# Patient Record
Sex: FEMALE | Race: WHITE | NOT HISPANIC OR LATINO
[De-identification: names, ages, dates, MRNs, and addresses within clinical notes are randomized per-mention and may not be internally consistent; named-entity substitution may affect disease eponyms.]

---

## 2020-08-24 PROBLEM — Z00.00 ENCOUNTER FOR PREVENTIVE HEALTH EXAMINATION: Status: ACTIVE | Noted: 2020-08-24

## 2020-08-28 ENCOUNTER — APPOINTMENT (OUTPATIENT)
Dept: OBGYN | Facility: CLINIC | Age: 70
End: 2020-08-28

## 2022-09-29 ENCOUNTER — OUTPATIENT (OUTPATIENT)
Dept: OUTPATIENT SERVICES | Facility: HOSPITAL | Age: 72
LOS: 1 days | Discharge: HOME | End: 2022-09-29

## 2022-09-29 DIAGNOSIS — R13.10 DYSPHAGIA, UNSPECIFIED: ICD-10-CM

## 2022-12-30 ENCOUNTER — OFFICE (OUTPATIENT)
Dept: URBAN - METROPOLITAN AREA CLINIC 77 | Facility: CLINIC | Age: 72
Setting detail: OPHTHALMOLOGY
End: 2022-12-30
Payer: MEDICARE

## 2022-12-30 DIAGNOSIS — H53.022: ICD-10-CM

## 2022-12-30 DIAGNOSIS — H40.033: ICD-10-CM

## 2022-12-30 DIAGNOSIS — H25.13: ICD-10-CM

## 2022-12-30 DIAGNOSIS — H35.371: ICD-10-CM

## 2022-12-30 PROCEDURE — 92250 FUNDUS PHOTOGRAPHY W/I&R: CPT | Performed by: OPHTHALMOLOGY

## 2022-12-30 PROCEDURE — 92004 COMPRE OPH EXAM NEW PT 1/>: CPT | Performed by: OPHTHALMOLOGY

## 2022-12-30 PROCEDURE — 92020 GONIOSCOPY: CPT | Performed by: OPHTHALMOLOGY

## 2022-12-30 ASSESSMENT — REFRACTION_CURRENTRX
OD_CYLINDER: -0.25
OD_SPHERE: +2.75
OS_OVR_VA: 20/
OS_CYLINDER: -0.25
OD_OVR_VA: 20/
OS_AXIS: 080
OD_AXIS: 78
OS_SPHERE: +5.00

## 2022-12-30 ASSESSMENT — REFRACTION_AUTOREFRACTION
OS_AXIS: 049
OS_SPHERE: +5.75
OD_SPHERE: +3.50
OS_CYLINDER: -1.00
OD_AXIS: 028
OD_CYLINDER: -1.00

## 2022-12-30 ASSESSMENT — SPHEQUIV_DERIVED
OS_SPHEQUIV: 5.25
OS_SPHEQUIV: 5.25
OD_SPHEQUIV: 3
OD_SPHEQUIV: 2.75

## 2022-12-30 ASSESSMENT — REFRACTION_MANIFEST
OS_ADD: +3.00
OD_SPHERE: +3.00
OD_VA1: 20/40
OS_AXIS: 45
OS_CYLINDER: -0.50
OD_ADD: +3.00
OS_SPHERE: +5.50
OS_VA1: 20/30
OD_AXIS: 25
OD_CYLINDER: -0.50

## 2022-12-30 ASSESSMENT — KERATOMETRY
OS_K2POWER_DIOPTERS: 45.00
OS_AXISANGLE_DEGREES: 122
OS_K1POWER_DIOPTERS: 44.50
OD_K1POWER_DIOPTERS: 44.00
OD_AXISANGLE_DEGREES: 138
OD_K2POWER_DIOPTERS: 44.25

## 2022-12-30 ASSESSMENT — CONFRONTATIONAL VISUAL FIELD TEST (CVF)
OS_FINDINGS: FULL
OD_FINDINGS: FULL

## 2022-12-30 ASSESSMENT — VISUAL ACUITY
OD_BCVA: 20/50
OS_BCVA: 20/40

## 2022-12-30 ASSESSMENT — AXIALLENGTH_DERIVED
OS_AL: 21.3277
OD_AL: 22.3589
OD_AL: 22.2718
OS_AL: 21.3277

## 2023-01-16 ENCOUNTER — OFFICE (OUTPATIENT)
Dept: URBAN - METROPOLITAN AREA CLINIC 77 | Facility: CLINIC | Age: 73
Setting detail: OPHTHALMOLOGY
End: 2023-01-16
Payer: MEDICARE

## 2023-01-16 DIAGNOSIS — H40.032: ICD-10-CM

## 2023-01-16 PROBLEM — H40.031 ANATOMICAL NARROW ANGLE; RIGHT EYE, LEFT EYE: Status: ACTIVE | Noted: 2023-01-16

## 2023-01-16 PROCEDURE — 66761 REVISION OF IRIS: CPT | Performed by: OPHTHALMOLOGY

## 2023-01-16 ASSESSMENT — TONOMETRY
OS_IOP_MMHG: 19
OD_IOP_MMHG: 17

## 2023-01-16 ASSESSMENT — CONFRONTATIONAL VISUAL FIELD TEST (CVF)
OS_FINDINGS: FULL
OD_FINDINGS: FULL

## 2023-01-17 ASSESSMENT — AXIALLENGTH_DERIVED
OS_AL: 21.3277
OS_AL: 21.3277
OD_AL: 22.3589
OD_AL: 22.2718

## 2023-01-17 ASSESSMENT — REFRACTION_CURRENTRX
OD_OVR_VA: 20/
OS_OVR_VA: 20/
OS_CYLINDER: -0.25
OD_SPHERE: +2.75
OS_SPHERE: +5.00
OD_CYLINDER: -0.25
OS_AXIS: 080
OD_AXIS: 78

## 2023-01-17 ASSESSMENT — REFRACTION_AUTOREFRACTION
OS_AXIS: 049
OD_SPHERE: +3.50
OD_AXIS: 028
OS_CYLINDER: -1.00
OD_CYLINDER: -1.00
OS_SPHERE: +5.75

## 2023-01-17 ASSESSMENT — SPHEQUIV_DERIVED
OS_SPHEQUIV: 5.25
OD_SPHEQUIV: 3
OS_SPHEQUIV: 5.25
OD_SPHEQUIV: 2.75

## 2023-01-17 ASSESSMENT — KERATOMETRY
OS_K1POWER_DIOPTERS: 44.50
OD_K2POWER_DIOPTERS: 44.25
OD_AXISANGLE_DEGREES: 138
OS_AXISANGLE_DEGREES: 122
OS_K2POWER_DIOPTERS: 45.00
OD_K1POWER_DIOPTERS: 44.00

## 2023-01-17 ASSESSMENT — REFRACTION_MANIFEST
OS_ADD: +3.00
OS_CYLINDER: -0.50
OD_ADD: +3.00
OS_AXIS: 45
OD_AXIS: 25
OS_VA1: 20/30
OS_SPHERE: +5.50
OD_SPHERE: +3.00
OD_VA1: 20/40
OD_CYLINDER: -0.50

## 2023-01-17 ASSESSMENT — VISUAL ACUITY
OD_BCVA: 20/50
OS_BCVA: 20/40

## 2023-04-24 ENCOUNTER — OFFICE (OUTPATIENT)
Dept: URBAN - METROPOLITAN AREA CLINIC 77 | Facility: CLINIC | Age: 73
Setting detail: OPHTHALMOLOGY
End: 2023-04-24
Payer: MEDICARE

## 2023-04-24 DIAGNOSIS — H53.022: ICD-10-CM

## 2023-04-24 DIAGNOSIS — H25.13: ICD-10-CM

## 2023-04-24 DIAGNOSIS — H40.032: ICD-10-CM

## 2023-04-24 DIAGNOSIS — H35.371: ICD-10-CM

## 2023-04-24 DIAGNOSIS — H40.031: ICD-10-CM

## 2023-04-24 PROCEDURE — 92250 FUNDUS PHOTOGRAPHY W/I&R: CPT | Performed by: OPHTHALMOLOGY

## 2023-04-24 PROCEDURE — 92020 GONIOSCOPY: CPT | Performed by: OPHTHALMOLOGY

## 2023-04-24 PROCEDURE — 99214 OFFICE O/P EST MOD 30 MIN: CPT | Performed by: OPHTHALMOLOGY

## 2023-04-24 ASSESSMENT — KERATOMETRY
OD_K1POWER_DIOPTERS: 44.00
OS_K1POWER_DIOPTERS: 44.50
OS_K2POWER_DIOPTERS: 45.00
OD_AXISANGLE_DEGREES: 138
OS_AXISANGLE_DEGREES: 122
OD_K2POWER_DIOPTERS: 44.25

## 2023-04-24 ASSESSMENT — REFRACTION_MANIFEST
OD_CYLINDER: -0.50
OS_ADD: +3.00
OD_ADD: +3.00
OD_SPHERE: +3.00
OD_AXIS: 25
OS_CYLINDER: -0.50
OS_SPHERE: +5.50
OS_AXIS: 45
OD_VA1: 20/40
OS_VA1: 20/30

## 2023-04-24 ASSESSMENT — REFRACTION_CURRENTRX
OS_CYLINDER: -0.25
OD_SPHERE: +2.75
OD_CYLINDER: -0.25
OS_SPHERE: +5.00
OS_OVR_VA: 20/
OS_AXIS: 080
OD_OVR_VA: 20/
OD_AXIS: 78

## 2023-04-24 ASSESSMENT — AXIALLENGTH_DERIVED
OD_AL: 22.2718
OD_AL: 22.3589
OS_AL: 21.3277
OS_AL: 21.3277

## 2023-04-24 ASSESSMENT — CONFRONTATIONAL VISUAL FIELD TEST (CVF)
OD_FINDINGS: FULL
OS_FINDINGS: FULL

## 2023-04-24 ASSESSMENT — REFRACTION_AUTOREFRACTION
OS_CYLINDER: -1.00
OD_AXIS: 028
OD_CYLINDER: -1.00
OS_SPHERE: +5.75
OS_AXIS: 049
OD_SPHERE: +3.50

## 2023-04-24 ASSESSMENT — VISUAL ACUITY
OS_BCVA: 20/40-
OD_BCVA: 20/30

## 2023-04-24 ASSESSMENT — SPHEQUIV_DERIVED
OS_SPHEQUIV: 5.25
OD_SPHEQUIV: 2.75
OS_SPHEQUIV: 5.25
OD_SPHEQUIV: 3

## 2023-04-24 ASSESSMENT — TONOMETRY
OS_IOP_MMHG: 17
OD_IOP_MMHG: 19

## 2023-06-19 ENCOUNTER — OFFICE (OUTPATIENT)
Dept: URBAN - METROPOLITAN AREA CLINIC 77 | Facility: CLINIC | Age: 73
Setting detail: OPHTHALMOLOGY
End: 2023-06-19
Payer: MEDICARE

## 2023-06-19 DIAGNOSIS — H40.031: ICD-10-CM

## 2023-06-19 PROCEDURE — 66761 REVISION OF IRIS: CPT | Performed by: OPHTHALMOLOGY

## 2023-06-19 ASSESSMENT — REFRACTION_AUTOREFRACTION
OD_AXIS: 028
OD_CYLINDER: -1.00
OS_SPHERE: +5.75
OS_AXIS: 049
OS_CYLINDER: -1.00
OD_SPHERE: +3.50

## 2023-06-19 ASSESSMENT — AXIALLENGTH_DERIVED
OS_AL: 21.3277
OD_AL: 22.2718
OS_AL: 21.3277
OD_AL: 22.3589

## 2023-06-19 ASSESSMENT — REFRACTION_MANIFEST
OD_AXIS: 25
OS_AXIS: 45
OD_ADD: +3.00
OS_VA1: 20/30
OD_CYLINDER: -0.50
OD_SPHERE: +3.00
OS_SPHERE: +5.50
OD_VA1: 20/40
OS_ADD: +3.00
OS_CYLINDER: -0.50

## 2023-06-19 ASSESSMENT — KERATOMETRY
OD_K1POWER_DIOPTERS: 44.00
OD_K2POWER_DIOPTERS: 44.25
OS_AXISANGLE_DEGREES: 122
OS_K1POWER_DIOPTERS: 44.50
OD_AXISANGLE_DEGREES: 138
OS_K2POWER_DIOPTERS: 45.00

## 2023-06-19 ASSESSMENT — REFRACTION_CURRENTRX
OD_AXIS: 78
OS_SPHERE: +5.00
OS_CYLINDER: -0.25
OD_CYLINDER: -0.25
OS_AXIS: 080
OD_SPHERE: +2.75
OD_OVR_VA: 20/
OS_OVR_VA: 20/

## 2023-06-19 ASSESSMENT — CONFRONTATIONAL VISUAL FIELD TEST (CVF)
OS_FINDINGS: FULL
OD_FINDINGS: FULL

## 2023-06-19 ASSESSMENT — SPHEQUIV_DERIVED
OS_SPHEQUIV: 5.25
OD_SPHEQUIV: 2.75
OD_SPHEQUIV: 3
OS_SPHEQUIV: 5.25

## 2023-06-19 ASSESSMENT — VISUAL ACUITY
OS_BCVA: 20/40
OD_BCVA: 20/30

## 2023-06-29 ENCOUNTER — APPOINTMENT (OUTPATIENT)
Dept: ORTHOPEDIC SURGERY | Facility: CLINIC | Age: 73
End: 2023-06-29
Payer: MEDICARE

## 2023-06-29 VITALS — WEIGHT: 148 LBS | BODY MASS INDEX: 29.06 KG/M2 | HEIGHT: 60 IN

## 2023-06-29 DIAGNOSIS — M17.12 UNILATERAL PRIMARY OSTEOARTHRITIS, LEFT KNEE: ICD-10-CM

## 2023-06-29 PROCEDURE — 73562 X-RAY EXAM OF KNEE 3: CPT | Mod: LT

## 2023-06-29 PROCEDURE — 99203 OFFICE O/P NEW LOW 30 MIN: CPT

## 2023-06-29 RX ORDER — NAPROXEN 500 MG/1
500 TABLET ORAL
Qty: 30 | Refills: 0 | Status: ACTIVE | COMMUNITY
Start: 2023-06-29 | End: 1900-01-01

## 2023-06-29 NOTE — HISTORY OF PRESENT ILLNESS
[de-identified] : 73-year-old female here for evaluation of left knee pain. Patient reports an aching nontraumatic pain that which worsens upon going from standing to standing position going up and down stairs.  Denies any trauma or falls.  He takes Advil over-the-counter without relief.  Able to bear weight and ambulate however experiences pain with doing so.\par

## 2023-06-29 NOTE — DISCUSSION/SUMMARY
[de-identified] : Treatment plan as discussed:\par \par My clinical suspicion is high for flareup of arthritis of the knee given the patient's history, physical examination findings, and x-ray findings.\par \par I recommended anti-inflammatory medication.  Naproxen sent to patient's pharmacy to be taken as needed for pain. Benefits discussed. Confirmed no contraindication to NSAIDs.\par \par I recommended patient rest, ice, compress, and elevate the left knee regularly. Encouraged activity modification as tolerable. Encouraged gentle range of motion to avoid stiffness. No gym /sports at least until further evaluation.\par \par Script for physical therapy provided for improved ROM and strengthening of surrounding musculature. Benefits discussed. \par \par All questions and concerns addressed to patient's satisfaction. Patient expresses full understanding of treatment plan.\par Patient will follow up in 4-6 weeks with Dr. Bains. Will consider cortisone/gel injections in repeat evaluation if symptoms do not improve.\par

## 2023-06-29 NOTE — IMAGING
[de-identified] : Physical examination left knee: Mild swelling appreciated throughout.  No ecchymosis erythema appreciated.  Skin is intact.  Patient tender to palpation along the medial joint line.  No tenderness lateral joint line.  Able to straight leg raise of the table.  Negative Doug's.  Negative Lachman's.  Good range of motion upon flexion and section despite pain and swelling.  Sensorimotor intact distally.  Neurovascular intact.  Calf is soft and nontender.  Able to ambulate and bear without any limitations.

## 2023-06-29 NOTE — DATA REVIEWED
[FreeTextEntry1] : xrays of left knee taken the office today: Mild to moderate osteoarthritic changes appreciated throughout.  No acute fractures, subluxations, or dislocations.\par

## 2023-07-26 ENCOUNTER — FORM ENCOUNTER (OUTPATIENT)
Age: 73
End: 2023-07-26

## 2023-07-31 ENCOUNTER — APPOINTMENT (OUTPATIENT)
Dept: ORTHOPEDIC SURGERY | Facility: CLINIC | Age: 73
End: 2023-07-31

## 2023-10-30 ENCOUNTER — OFFICE (OUTPATIENT)
Dept: URBAN - METROPOLITAN AREA CLINIC 77 | Facility: CLINIC | Age: 73
Setting detail: OPHTHALMOLOGY
End: 2023-10-30
Payer: MEDICARE

## 2023-10-30 DIAGNOSIS — H25.11: ICD-10-CM

## 2023-10-30 DIAGNOSIS — H40.031: ICD-10-CM

## 2023-10-30 DIAGNOSIS — H40.032: ICD-10-CM

## 2023-10-30 DIAGNOSIS — H53.022: ICD-10-CM

## 2023-10-30 DIAGNOSIS — H52.31: ICD-10-CM

## 2023-10-30 DIAGNOSIS — H35.371: ICD-10-CM

## 2023-10-30 DIAGNOSIS — H25.12: ICD-10-CM

## 2023-10-30 PROCEDURE — 99214 OFFICE O/P EST MOD 30 MIN: CPT | Performed by: OPHTHALMOLOGY

## 2023-10-30 PROCEDURE — 92250 FUNDUS PHOTOGRAPHY W/I&R: CPT | Performed by: OPHTHALMOLOGY

## 2023-10-30 ASSESSMENT — REFRACTION_AUTOREFRACTION
OS_AXIS: 049
OS_CYLINDER: -1.00
OD_SPHERE: +3.50
OD_AXIS: 028
OS_SPHERE: +5.75
OD_CYLINDER: -1.00

## 2023-10-30 ASSESSMENT — TONOMETRY
OS_IOP_MMHG: 20
OD_IOP_MMHG: 20

## 2023-10-30 ASSESSMENT — REFRACTION_MANIFEST
OD_SPHERE: +3.00
OS_AXIS: 45
OS_CYLINDER: -0.50
OS_ADD: +3.00
OD_AXIS: 25
OD_CYLINDER: -0.50
OD_ADD: +3.00
OD_VA1: 20/40
OS_SPHERE: +5.50
OS_VA1: 20/30

## 2023-10-30 ASSESSMENT — AXIALLENGTH_DERIVED
OS_AL: 21.3277
OS_AL: 21.3277
OD_AL: 22.2718
OD_AL: 22.3589

## 2023-10-30 ASSESSMENT — KERATOMETRY
OS_K2POWER_DIOPTERS: 45.00
OD_K1POWER_DIOPTERS: 44.00
OD_AXISANGLE_DEGREES: 138
OS_K1POWER_DIOPTERS: 44.50
OS_AXISANGLE_DEGREES: 122
OD_K2POWER_DIOPTERS: 44.25

## 2023-10-30 ASSESSMENT — REFRACTION_CURRENTRX
OD_SPHERE: +2.75
OS_SPHERE: +5.00
OD_OVR_VA: 20/
OS_CYLINDER: -0.25
OD_CYLINDER: -0.25
OS_OVR_VA: 20/
OD_AXIS: 78
OS_AXIS: 080

## 2023-10-30 ASSESSMENT — SPHEQUIV_DERIVED
OD_SPHEQUIV: 3
OS_SPHEQUIV: 5.25
OS_SPHEQUIV: 5.25
OD_SPHEQUIV: 2.75

## 2023-10-30 ASSESSMENT — CONFRONTATIONAL VISUAL FIELD TEST (CVF)
OS_FINDINGS: FULL
OD_FINDINGS: FULL

## 2023-10-30 ASSESSMENT — VISUAL ACUITY
OD_BCVA: 20/30-
OS_BCVA: 20/40

## 2024-07-10 ENCOUNTER — OFFICE (OUTPATIENT)
Dept: URBAN - METROPOLITAN AREA CLINIC 77 | Facility: CLINIC | Age: 74
Setting detail: OPHTHALMOLOGY
End: 2024-07-10
Payer: MEDICARE

## 2024-07-10 DIAGNOSIS — H25.11: ICD-10-CM

## 2024-07-10 DIAGNOSIS — H53.2: ICD-10-CM

## 2024-07-10 DIAGNOSIS — H52.31: ICD-10-CM

## 2024-07-10 DIAGNOSIS — H25.12: ICD-10-CM

## 2024-07-10 DIAGNOSIS — H35.371: ICD-10-CM

## 2024-07-10 DIAGNOSIS — H40.032: ICD-10-CM

## 2024-07-10 DIAGNOSIS — H53.022: ICD-10-CM

## 2024-07-10 DIAGNOSIS — H40.031: ICD-10-CM

## 2024-07-10 DIAGNOSIS — H26.493: ICD-10-CM

## 2024-07-10 PROCEDURE — 92235 FLUORESCEIN ANGRPH MLTIFRAME: CPT | Performed by: OPHTHALMOLOGY

## 2024-07-10 PROCEDURE — 92250 FUNDUS PHOTOGRAPHY W/I&R: CPT | Performed by: OPHTHALMOLOGY

## 2024-07-10 PROCEDURE — 99214 OFFICE O/P EST MOD 30 MIN: CPT | Performed by: OPHTHALMOLOGY

## 2024-07-10 ASSESSMENT — CONFRONTATIONAL VISUAL FIELD TEST (CVF)
OS_FINDINGS: FULL
OD_FINDINGS: FULL

## 2024-08-06 ENCOUNTER — OUTPATIENT HOSPITAL (OUTPATIENT)
Dept: URBAN - METROPOLITAN AREA CLINIC 78 | Facility: CLINIC | Age: 74
Setting detail: OPHTHALMOLOGY
End: 2024-08-06
Payer: MEDICARE

## 2024-08-06 DIAGNOSIS — H35.371: ICD-10-CM

## 2024-08-06 PROCEDURE — 67042 VIT FOR MACULAR HOLE: CPT | Mod: RT | Performed by: OPHTHALMOLOGY

## 2024-08-07 ENCOUNTER — RX ONLY (RX ONLY)
Age: 74
End: 2024-08-07

## 2024-08-07 ENCOUNTER — OFFICE (OUTPATIENT)
Dept: URBAN - METROPOLITAN AREA CLINIC 77 | Facility: CLINIC | Age: 74
Setting detail: OPHTHALMOLOGY
End: 2024-08-07
Payer: MEDICARE

## 2024-08-07 DIAGNOSIS — H52.31: ICD-10-CM

## 2024-08-07 DIAGNOSIS — H40.032: ICD-10-CM

## 2024-08-07 DIAGNOSIS — H26.493: ICD-10-CM

## 2024-08-07 DIAGNOSIS — H40.031: ICD-10-CM

## 2024-08-07 DIAGNOSIS — H35.371: ICD-10-CM

## 2024-08-07 DIAGNOSIS — H25.12: ICD-10-CM

## 2024-08-07 DIAGNOSIS — H25.11: ICD-10-CM

## 2024-08-07 DIAGNOSIS — H53.022: ICD-10-CM

## 2024-08-07 DIAGNOSIS — H53.2: ICD-10-CM

## 2024-08-07 PROCEDURE — 99024 POSTOP FOLLOW-UP VISIT: CPT | Performed by: OPHTHALMOLOGY

## 2024-08-07 ASSESSMENT — CONFRONTATIONAL VISUAL FIELD TEST (CVF)
OD_FINDINGS: FULL
OS_FINDINGS: FULL

## 2024-08-14 ENCOUNTER — OFFICE (OUTPATIENT)
Dept: URBAN - METROPOLITAN AREA CLINIC 77 | Facility: CLINIC | Age: 74
Setting detail: OPHTHALMOLOGY
End: 2024-08-14
Payer: MEDICARE

## 2024-08-14 DIAGNOSIS — H35.371: ICD-10-CM

## 2024-08-14 DIAGNOSIS — H53.2: ICD-10-CM

## 2024-08-14 PROCEDURE — 92134 CPTRZ OPH DX IMG PST SGM RTA: CPT | Performed by: OPHTHALMOLOGY

## 2024-08-14 PROCEDURE — 99214 OFFICE O/P EST MOD 30 MIN: CPT | Mod: 24 | Performed by: OPHTHALMOLOGY

## 2024-08-14 ASSESSMENT — CONFRONTATIONAL VISUAL FIELD TEST (CVF)
OD_FINDINGS: FULL
OS_FINDINGS: FULL

## 2024-11-02 ENCOUNTER — OFFICE (OUTPATIENT)
Dept: URBAN - METROPOLITAN AREA CLINIC 76 | Facility: CLINIC | Age: 74
Setting detail: OPHTHALMOLOGY
End: 2024-11-02
Payer: MEDICARE

## 2024-11-02 ENCOUNTER — RX ONLY (RX ONLY)
Age: 74
End: 2024-11-02

## 2024-11-02 DIAGNOSIS — H20.013: ICD-10-CM

## 2024-11-02 PROCEDURE — 92012 INTRM OPH EXAM EST PATIENT: CPT | Performed by: STUDENT IN AN ORGANIZED HEALTH CARE EDUCATION/TRAINING PROGRAM

## 2024-11-02 PROCEDURE — 92020 GONIOSCOPY: CPT | Performed by: STUDENT IN AN ORGANIZED HEALTH CARE EDUCATION/TRAINING PROGRAM

## 2024-11-02 ASSESSMENT — REFRACTION_CURRENTRX
OD_AXIS: 78
OS_SPHERE: +5.00
OD_SPHERE: +2.75
OD_OVR_VA: 20/
OS_CYLINDER: -0.25
OS_OVR_VA: 20/
OD_CYLINDER: -0.25
OS_AXIS: 080

## 2024-11-02 ASSESSMENT — REFRACTION_MANIFEST
OS_SPHERE: +5.50
OS_CYLINDER: -1.00
OS_CYLINDER: -0.50
OS_ADD: +3.00
OS_ADD: +3.00
OS_VA1: 20/25
OD_ADD: +3.00
OD_CYLINDER: -0.50
OS_VA1: 20/30
OD_ADD: +3.00
OS_SPHERE: +1.50
OS_AXIS: 55
OD_ADD: +3.00
OD_VA1: 20/40
OD_VA1: 20/40-
OS_ADD: +3.00
OD_SPHERE: +0.75
OD_SPHERE: +3.00
OD_AXIS: 25
OS_SPHERE: +2.00
OD_VA1: 20/60
OS_VA1: 20/25
OS_AXIS: 45
OD_SPHERE: +1.25

## 2024-11-02 ASSESSMENT — KERATOMETRY
OD_K1POWER_DIOPTERS: 44.50
OD_K2POWER_DIOPTERS: 45.00
OS_AXISANGLE_DEGREES: 115
OS_K2POWER_DIOPTERS: 44.75
OD_AXISANGLE_DEGREES: 098
OS_K1POWER_DIOPTERS: 44.25

## 2024-11-02 ASSESSMENT — REFRACTION_AUTOREFRACTION
OD_AXIS: 037
OD_CYLINDER: -0.25
OD_SPHERE: +1.00
OS_AXIS: 056
OS_CYLINDER: -0.50
OS_SPHERE: +2.25

## 2024-11-02 ASSESSMENT — VISUAL ACUITY
OD_BCVA: 20/100
OS_BCVA: 20/100

## 2024-11-02 ASSESSMENT — CONFRONTATIONAL VISUAL FIELD TEST (CVF)
OD_FINDINGS: FULL
OS_FINDINGS: FULL

## 2024-11-11 ENCOUNTER — OFFICE (OUTPATIENT)
Dept: URBAN - METROPOLITAN AREA CLINIC 76 | Facility: CLINIC | Age: 74
Setting detail: OPHTHALMOLOGY
End: 2024-11-11
Payer: MEDICARE

## 2024-11-11 ENCOUNTER — RX ONLY (RX ONLY)
Age: 74
End: 2024-11-11

## 2024-11-11 DIAGNOSIS — H20.013: ICD-10-CM

## 2024-11-11 PROCEDURE — 92012 INTRM OPH EXAM EST PATIENT: CPT | Performed by: STUDENT IN AN ORGANIZED HEALTH CARE EDUCATION/TRAINING PROGRAM

## 2024-11-11 ASSESSMENT — REFRACTION_MANIFEST
OS_ADD: +3.00
OD_VA1: 20/60
OS_AXIS: 45
OD_SPHERE: +0.75
OS_ADD: +3.00
OD_ADD: +3.00
OS_VA1: 20/25
OD_SPHERE: +1.25
OS_ADD: +3.00
OD_AXIS: 25
OD_CYLINDER: -0.50
OS_SPHERE: +2.00
OS_CYLINDER: -1.00
OD_SPHERE: +3.00
OS_SPHERE: +1.50
OD_VA1: 20/40-
OD_VA1: 20/40
OS_VA1: 20/25
OS_SPHERE: +5.50
OD_ADD: +3.00
OD_ADD: +3.00
OS_CYLINDER: -0.50
OS_VA1: 20/30
OS_AXIS: 55

## 2024-11-11 ASSESSMENT — KERATOMETRY
OS_K1POWER_DIOPTERS: 44.25
OD_K2POWER_DIOPTERS: 45.00
OD_AXISANGLE_DEGREES: 098
OS_K2POWER_DIOPTERS: 44.75
OS_AXISANGLE_DEGREES: 115
OD_K1POWER_DIOPTERS: 44.50

## 2024-11-11 ASSESSMENT — VISUAL ACUITY
OS_BCVA: 20/40
OD_BCVA: 20/30

## 2024-11-11 ASSESSMENT — REFRACTION_AUTOREFRACTION
OS_AXIS: 056
OD_AXIS: 037
OS_SPHERE: +2.25
OD_SPHERE: +1.00
OD_CYLINDER: -0.25
OS_CYLINDER: -0.50

## 2024-11-11 ASSESSMENT — REFRACTION_CURRENTRX
OS_CYLINDER: -0.25
OS_AXIS: 080
OD_CYLINDER: -0.25
OS_SPHERE: +5.00
OD_SPHERE: +2.75
OS_OVR_VA: 20/
OD_AXIS: 78
OD_OVR_VA: 20/

## 2024-11-11 ASSESSMENT — TONOMETRY: OS_IOP_MMHG: 20

## 2024-11-11 ASSESSMENT — CONFRONTATIONAL VISUAL FIELD TEST (CVF)
OD_FINDINGS: FULL
OS_FINDINGS: FULL

## 2024-11-24 ENCOUNTER — RX ONLY (RX ONLY)
Age: 74
End: 2024-11-24

## 2024-11-24 ENCOUNTER — OFFICE (OUTPATIENT)
Dept: URBAN - METROPOLITAN AREA CLINIC 77 | Facility: CLINIC | Age: 74
Setting detail: OPHTHALMOLOGY
End: 2024-11-24
Payer: MEDICARE

## 2024-11-24 DIAGNOSIS — H20.013: ICD-10-CM

## 2024-11-24 PROCEDURE — 92012 INTRM OPH EXAM EST PATIENT: CPT | Performed by: STUDENT IN AN ORGANIZED HEALTH CARE EDUCATION/TRAINING PROGRAM

## 2024-11-24 ASSESSMENT — VISUAL ACUITY
OD_BCVA: 20/30
OS_BCVA: 20/70

## 2024-11-24 ASSESSMENT — REFRACTION_MANIFEST
OS_SPHERE: +1.50
OS_AXIS: 55
OS_ADD: +3.00
OD_SPHERE: +3.00
OS_ADD: +3.00
OD_SPHERE: +0.75
OD_AXIS: 25
OD_VA1: 20/60
OS_ADD: +3.00
OD_ADD: +3.00
OS_VA1: 20/25
OD_ADD: +3.00
OS_CYLINDER: -1.00
OD_VA1: 20/40
OS_AXIS: 45
OS_CYLINDER: -0.50
OD_VA1: 20/40-
OS_SPHERE: +5.50
OS_VA1: 20/25
OS_VA1: 20/30
OD_CYLINDER: -0.50
OD_ADD: +3.00
OS_SPHERE: +2.00
OD_SPHERE: +1.25

## 2024-11-24 ASSESSMENT — REFRACTION_CURRENTRX
OS_AXIS: 080
OD_CYLINDER: -0.25
OD_AXIS: 78
OS_OVR_VA: 20/
OS_CYLINDER: -0.25
OS_SPHERE: +5.00
OD_SPHERE: +2.75
OD_OVR_VA: 20/

## 2024-11-24 ASSESSMENT — KERATOMETRY
OD_AXISANGLE_DEGREES: 098
OS_K1POWER_DIOPTERS: 44.25
OS_AXISANGLE_DEGREES: 115
OD_K2POWER_DIOPTERS: 45.00
OD_K1POWER_DIOPTERS: 44.50
OS_K2POWER_DIOPTERS: 44.75

## 2024-11-24 ASSESSMENT — REFRACTION_AUTOREFRACTION
OD_AXIS: 037
OS_SPHERE: +2.25
OD_SPHERE: +1.00
OS_CYLINDER: -0.50
OS_AXIS: 056
OD_CYLINDER: -0.25

## 2024-11-24 ASSESSMENT — TONOMETRY
OD_IOP_MMHG: 18
OS_IOP_MMHG: 14

## 2024-11-24 ASSESSMENT — CONFRONTATIONAL VISUAL FIELD TEST (CVF)
OD_FINDINGS: FULL
OS_FINDINGS: FULL

## 2024-12-05 ENCOUNTER — OFFICE (OUTPATIENT)
Dept: URBAN - METROPOLITAN AREA CLINIC 77 | Facility: CLINIC | Age: 74
Setting detail: OPHTHALMOLOGY
End: 2024-12-05
Payer: MEDICARE

## 2024-12-05 ENCOUNTER — RX ONLY (RX ONLY)
Age: 74
End: 2024-12-05

## 2024-12-05 DIAGNOSIS — H16.223: ICD-10-CM

## 2024-12-05 DIAGNOSIS — H20.013: ICD-10-CM

## 2024-12-05 DIAGNOSIS — H35.371: ICD-10-CM

## 2024-12-05 PROCEDURE — 92134 CPTRZ OPH DX IMG PST SGM RTA: CPT | Performed by: OPHTHALMOLOGY

## 2024-12-05 PROCEDURE — 99213 OFFICE O/P EST LOW 20 MIN: CPT | Performed by: OPHTHALMOLOGY

## 2024-12-05 ASSESSMENT — REFRACTION_MANIFEST
OD_ADD: +3.00
OS_ADD: +3.00
OS_CYLINDER: -0.50
OS_ADD: +3.00
OS_VA1: 20/25
OD_VA1: 20/40
OD_CYLINDER: -0.50
OD_ADD: +3.00
OD_ADD: +3.00
OD_AXIS: 25
OD_VA1: 20/60
OD_SPHERE: +1.25
OD_VA1: 20/40-
OS_ADD: +3.00
OS_VA1: 20/25
OS_SPHERE: +5.50
OS_AXIS: 45
OS_AXIS: 55
OD_SPHERE: +0.75
OD_SPHERE: +3.00
OS_CYLINDER: -1.00
OS_SPHERE: +1.50
OS_SPHERE: +2.00
OS_VA1: 20/30

## 2024-12-05 ASSESSMENT — REFRACTION_AUTOREFRACTION
OS_SPHERE: +2.25
OD_SPHERE: +1.00
OD_AXIS: 037
OS_AXIS: 056
OD_CYLINDER: -0.25
OS_CYLINDER: -0.50

## 2024-12-05 ASSESSMENT — REFRACTION_CURRENTRX
OS_AXIS: 080
OS_CYLINDER: -0.25
OS_OVR_VA: 20/
OD_CYLINDER: -0.25
OD_SPHERE: +2.75
OS_SPHERE: +5.00
OD_OVR_VA: 20/
OD_AXIS: 78

## 2024-12-05 ASSESSMENT — VISUAL ACUITY
OD_BCVA: 20/25
OS_BCVA: 20/80

## 2024-12-05 ASSESSMENT — KERATOMETRY
OD_K1POWER_DIOPTERS: 44.50
OS_K2POWER_DIOPTERS: 44.75
OS_K1POWER_DIOPTERS: 44.25
OD_K2POWER_DIOPTERS: 45.00
OS_AXISANGLE_DEGREES: 115
OD_AXISANGLE_DEGREES: 098

## 2024-12-05 ASSESSMENT — TONOMETRY
OS_IOP_MMHG: 14
OD_IOP_MMHG: 21

## 2024-12-05 ASSESSMENT — CONFRONTATIONAL VISUAL FIELD TEST (CVF)
OS_FINDINGS: FULL
OD_FINDINGS: FULL

## 2024-12-27 ENCOUNTER — RX ONLY (RX ONLY)
Age: 74
End: 2024-12-27

## 2024-12-27 ENCOUNTER — OFFICE (OUTPATIENT)
Dept: URBAN - METROPOLITAN AREA CLINIC 76 | Facility: CLINIC | Age: 74
Setting detail: OPHTHALMOLOGY
End: 2024-12-27
Payer: MEDICARE

## 2024-12-27 DIAGNOSIS — H20.013: ICD-10-CM

## 2024-12-27 DIAGNOSIS — H16.223: ICD-10-CM

## 2024-12-27 DIAGNOSIS — H35.371: ICD-10-CM

## 2024-12-27 DIAGNOSIS — H53.022: ICD-10-CM

## 2024-12-27 DIAGNOSIS — H25.12: ICD-10-CM

## 2024-12-27 DIAGNOSIS — H53.2: ICD-10-CM

## 2024-12-27 DIAGNOSIS — H25.11: ICD-10-CM

## 2024-12-27 DIAGNOSIS — H26.493: ICD-10-CM

## 2024-12-27 DIAGNOSIS — H52.31: ICD-10-CM

## 2024-12-27 PROCEDURE — 92012 INTRM OPH EXAM EST PATIENT: CPT | Performed by: STUDENT IN AN ORGANIZED HEALTH CARE EDUCATION/TRAINING PROGRAM

## 2024-12-27 ASSESSMENT — CONFRONTATIONAL VISUAL FIELD TEST (CVF)
OD_FINDINGS: FULL
OS_FINDINGS: FULL

## 2024-12-30 ASSESSMENT — REFRACTION_MANIFEST
OS_ADD: +3.00
OS_VA1: 20/25
OD_VA1: 20/40-
OS_VA1: 20/30
OD_SPHERE: +1.25
OD_ADD: +3.00
OD_SPHERE: +0.75
OD_VA1: 20/40
OS_ADD: +3.00
OS_CYLINDER: -1.00
OS_SPHERE: +5.50
OD_SPHERE: +3.00
OS_AXIS: 55
OD_CYLINDER: -0.50
OD_AXIS: 25
OS_VA1: 20/25
OS_ADD: +3.00
OD_ADD: +3.00
OD_ADD: +3.00
OS_CYLINDER: -0.50
OD_VA1: 20/60
OS_SPHERE: +2.00
OS_SPHERE: +1.50
OS_AXIS: 45

## 2024-12-30 ASSESSMENT — REFRACTION_CURRENTRX
OS_SPHERE: +5.00
OD_SPHERE: +2.75
OD_CYLINDER: -0.25
OS_CYLINDER: -0.25
OS_AXIS: 080
OS_OVR_VA: 20/
OD_OVR_VA: 20/
OD_AXIS: 78

## 2024-12-30 ASSESSMENT — KERATOMETRY
OS_AXISANGLE_DEGREES: 115
OD_K1POWER_DIOPTERS: 44.50
OS_K2POWER_DIOPTERS: 44.75
OD_K2POWER_DIOPTERS: 45.00
OD_AXISANGLE_DEGREES: 098
OS_K1POWER_DIOPTERS: 44.25

## 2024-12-30 ASSESSMENT — REFRACTION_AUTOREFRACTION
OD_AXIS: 037
OD_CYLINDER: -0.25
OS_AXIS: 056
OS_CYLINDER: -0.50
OD_SPHERE: +1.00
OS_SPHERE: +2.25

## 2024-12-30 ASSESSMENT — VISUAL ACUITY
OS_BCVA: 20/60 +1
OD_BCVA: 20/80 -2

## 2025-01-01 ENCOUNTER — NON-APPOINTMENT (OUTPATIENT)
Age: 75
End: 2025-01-01

## 2025-07-22 ENCOUNTER — APPOINTMENT (OUTPATIENT)
Dept: CARDIOLOGY | Facility: CLINIC | Age: 75
End: 2025-07-22
Payer: MEDICARE

## 2025-07-22 ENCOUNTER — NON-APPOINTMENT (OUTPATIENT)
Age: 75
End: 2025-07-22

## 2025-07-22 VITALS
SYSTOLIC BLOOD PRESSURE: 140 MMHG | OXYGEN SATURATION: 96 % | WEIGHT: 149 LBS | BODY MASS INDEX: 29.25 KG/M2 | TEMPERATURE: 97 F | HEART RATE: 59 BPM | RESPIRATION RATE: 16 BRPM | DIASTOLIC BLOOD PRESSURE: 80 MMHG | HEIGHT: 60 IN

## 2025-07-22 DIAGNOSIS — Z78.9 OTHER SPECIFIED HEALTH STATUS: ICD-10-CM

## 2025-07-22 PROCEDURE — 93000 ELECTROCARDIOGRAM COMPLETE: CPT

## 2025-07-22 PROCEDURE — 99204 OFFICE O/P NEW MOD 45 MIN: CPT

## 2025-07-22 RX ORDER — AMLODIPINE BESYLATE 2.5 MG/1
2.5 TABLET ORAL DAILY
Qty: 30 | Refills: 0 | Status: ACTIVE | COMMUNITY
Start: 2025-07-22 | End: 1900-01-01

## 2025-07-22 RX ORDER — ASPIRIN 81 MG
81 TABLET, DELAYED RELEASE (ENTERIC COATED) ORAL DAILY
Refills: 0 | Status: ACTIVE | COMMUNITY

## 2025-07-22 RX ORDER — ROSUVASTATIN CALCIUM 10 MG/1
10 TABLET, FILM COATED ORAL DAILY
Refills: 0 | Status: ACTIVE | COMMUNITY

## 2025-07-25 PROBLEM — K57.92 DIVERTICULITIS OF INTESTINE, PART UNSPECIFIED, WITHOUT PERFORATION OR ABSCESS WITHOUT BLEEDING: Chronic | Status: ACTIVE | Noted: 2025-07-19

## 2025-07-25 PROBLEM — M81.0 AGE-RELATED OSTEOPOROSIS WITHOUT CURRENT PATHOLOGICAL FRACTURE: Chronic | Status: ACTIVE | Noted: 2025-07-19

## 2025-07-25 PROBLEM — E04.1 NONTOXIC SINGLE THYROID NODULE: Chronic | Status: ACTIVE | Noted: 2025-07-19

## 2025-07-30 ENCOUNTER — TRANSCRIPTION ENCOUNTER (OUTPATIENT)
Age: 75
End: 2025-07-30

## 2025-07-30 ENCOUNTER — OUTPATIENT (OUTPATIENT)
Dept: OUTPATIENT SERVICES | Facility: HOSPITAL | Age: 75
LOS: 1 days | Discharge: HOME | End: 2025-07-30
Payer: MEDICARE

## 2025-07-30 VITALS
HEIGHT: 60 IN | HEART RATE: 60 BPM | RESPIRATION RATE: 16 BRPM | DIASTOLIC BLOOD PRESSURE: 67 MMHG | SYSTOLIC BLOOD PRESSURE: 126 MMHG | OXYGEN SATURATION: 97 % | WEIGHT: 149.91 LBS

## 2025-07-30 VITALS
OXYGEN SATURATION: 96 % | DIASTOLIC BLOOD PRESSURE: 56 MMHG | RESPIRATION RATE: 15 BRPM | HEART RATE: 55 BPM | SYSTOLIC BLOOD PRESSURE: 126 MMHG

## 2025-07-30 DIAGNOSIS — I25.10 ATHEROSCLEROTIC HEART DISEASE OF NATIVE CORONARY ARTERY WITHOUT ANGINA PECTORIS: ICD-10-CM

## 2025-07-30 LAB
ANION GAP SERPL CALC-SCNC: 12 MMOL/L — SIGNIFICANT CHANGE UP (ref 7–14)
BUN SERPL-MCNC: 16 MG/DL — SIGNIFICANT CHANGE UP (ref 10–20)
CALCIUM SERPL-MCNC: 9.7 MG/DL — SIGNIFICANT CHANGE UP (ref 8.4–10.5)
CHLORIDE SERPL-SCNC: 104 MMOL/L — SIGNIFICANT CHANGE UP (ref 98–110)
CO2 SERPL-SCNC: 26 MMOL/L — SIGNIFICANT CHANGE UP (ref 17–32)
CREAT SERPL-MCNC: 0.8 MG/DL — SIGNIFICANT CHANGE UP (ref 0.7–1.5)
EGFR: 76 ML/MIN/1.73M2 — SIGNIFICANT CHANGE UP
EGFR: 76 ML/MIN/1.73M2 — SIGNIFICANT CHANGE UP
GLUCOSE SERPL-MCNC: 89 MG/DL — SIGNIFICANT CHANGE UP (ref 70–99)
HCT VFR BLD CALC: 45.3 % — SIGNIFICANT CHANGE UP (ref 37–47)
HGB BLD-MCNC: 14.2 G/DL — SIGNIFICANT CHANGE UP (ref 12–16)
MCHC RBC-ENTMCNC: 28 PG — SIGNIFICANT CHANGE UP (ref 27–31)
MCHC RBC-ENTMCNC: 31.3 G/DL — LOW (ref 32–37)
MCV RBC AUTO: 89.3 FL — SIGNIFICANT CHANGE UP (ref 81–99)
NRBC BLD AUTO-RTO: 0 /100 WBCS — SIGNIFICANT CHANGE UP (ref 0–0)
PLATELET # BLD AUTO: 208 K/UL — SIGNIFICANT CHANGE UP (ref 130–400)
PMV BLD: 11.4 FL — HIGH (ref 7.4–10.4)
POTASSIUM SERPL-MCNC: 4.1 MMOL/L — SIGNIFICANT CHANGE UP (ref 3.5–5)
POTASSIUM SERPL-SCNC: 4.1 MMOL/L — SIGNIFICANT CHANGE UP (ref 3.5–5)
RBC # BLD: 5.07 M/UL — SIGNIFICANT CHANGE UP (ref 4.2–5.4)
RBC # FLD: 13.5 % — SIGNIFICANT CHANGE UP (ref 11.5–14.5)
SODIUM SERPL-SCNC: 142 MMOL/L — SIGNIFICANT CHANGE UP (ref 135–146)
WBC # BLD: 7.46 K/UL — SIGNIFICANT CHANGE UP (ref 4.8–10.8)
WBC # FLD AUTO: 7.46 K/UL — SIGNIFICANT CHANGE UP (ref 4.8–10.8)

## 2025-07-30 PROCEDURE — 93010 ELECTROCARDIOGRAM REPORT: CPT

## 2025-07-30 PROCEDURE — C1887: CPT

## 2025-07-30 PROCEDURE — 36415 COLL VENOUS BLD VENIPUNCTURE: CPT

## 2025-07-30 PROCEDURE — 93458 L HRT ARTERY/VENTRICLE ANGIO: CPT | Mod: 26

## 2025-07-30 PROCEDURE — 85027 COMPLETE CBC AUTOMATED: CPT

## 2025-07-30 PROCEDURE — 0523T NTRAPX C FFR W/3D FUNCJL MAP: CPT

## 2025-07-30 PROCEDURE — 80048 BASIC METABOLIC PNL TOTAL CA: CPT

## 2025-07-30 PROCEDURE — C1769: CPT

## 2025-07-30 PROCEDURE — 93458 L HRT ARTERY/VENTRICLE ANGIO: CPT

## 2025-07-30 PROCEDURE — C1894: CPT

## 2025-07-30 PROCEDURE — 93005 ELECTROCARDIOGRAM TRACING: CPT

## 2025-07-30 RX ORDER — RANOLAZINE 1000 MG/1
500 TABLET, FILM COATED, EXTENDED RELEASE ORAL ONCE
Refills: 0 | Status: COMPLETED | OUTPATIENT
Start: 2025-07-30 | End: 2025-07-30

## 2025-07-30 RX ORDER — AMLODIPINE BESYLATE 10 MG/1
1 TABLET ORAL
Refills: 0 | DISCHARGE

## 2025-07-30 RX ORDER — ROSUVASTATIN CALCIUM 20 MG/1
1 TABLET, FILM COATED ORAL
Refills: 0 | DISCHARGE

## 2025-07-30 RX ORDER — ASPIRIN 325 MG
0 TABLET ORAL
Refills: 0 | DISCHARGE

## 2025-07-30 RX ADMIN — Medication 150 MILLILITER(S): at 13:49

## 2025-07-30 RX ADMIN — Medication 75 MILLILITER(S): at 10:26

## 2025-07-30 RX ADMIN — Medication 250 MILLILITER(S): at 10:26

## 2025-07-30 RX ADMIN — RANOLAZINE 500 MILLIGRAM(S): 1000 TABLET, FILM COATED, EXTENDED RELEASE ORAL at 10:25

## 2025-08-04 DIAGNOSIS — I25.118 ATHEROSCLEROTIC HEART DISEASE OF NATIVE CORONARY ARTERY WITH OTHER FORMS OF ANGINA PECTORIS: ICD-10-CM

## 2025-08-04 DIAGNOSIS — R93.1 ABNORMAL FINDINGS ON DIAGNOSTIC IMAGING OF HEART AND CORONARY CIRCULATION: ICD-10-CM

## 2025-08-05 PROBLEM — I25.10 ATHEROSCLEROTIC HEART DISEASE OF NATIVE CORONARY ARTERY WITHOUT ANGINA PECTORIS: Chronic | Status: ACTIVE | Noted: 2025-07-30

## 2025-08-05 PROBLEM — E78.5 HYPERLIPIDEMIA, UNSPECIFIED: Chronic | Status: ACTIVE | Noted: 2025-07-30

## 2025-08-12 ENCOUNTER — APPOINTMENT (OUTPATIENT)
Dept: CARDIOLOGY | Facility: CLINIC | Age: 75
End: 2025-08-12
Payer: MEDICARE

## 2025-08-12 VITALS
BODY MASS INDEX: 29.25 KG/M2 | DIASTOLIC BLOOD PRESSURE: 72 MMHG | HEIGHT: 60 IN | HEART RATE: 62 BPM | WEIGHT: 149 LBS | SYSTOLIC BLOOD PRESSURE: 134 MMHG | OXYGEN SATURATION: 98 %

## 2025-08-12 DIAGNOSIS — E78.5 HYPERLIPIDEMIA, UNSPECIFIED: ICD-10-CM

## 2025-08-12 DIAGNOSIS — I25.10 ATHEROSCLEROTIC HEART DISEASE OF NATIVE CORONARY ARTERY W/OUT ANGINA PECTORIS: ICD-10-CM

## 2025-08-12 DIAGNOSIS — R94.31 ABNORMAL ELECTROCARDIOGRAM [ECG] [EKG]: ICD-10-CM

## 2025-08-12 DIAGNOSIS — R06.00 DYSPNEA, UNSPECIFIED: ICD-10-CM

## 2025-08-12 PROCEDURE — 93000 ELECTROCARDIOGRAM COMPLETE: CPT

## 2025-08-12 PROCEDURE — 99214 OFFICE O/P EST MOD 30 MIN: CPT

## 2025-08-18 ENCOUNTER — APPOINTMENT (OUTPATIENT)
Dept: CARDIOLOGY | Facility: CLINIC | Age: 75
End: 2025-08-18